# Patient Record
Sex: MALE | Race: WHITE | NOT HISPANIC OR LATINO | URBAN - METROPOLITAN AREA
[De-identification: names, ages, dates, MRNs, and addresses within clinical notes are randomized per-mention and may not be internally consistent; named-entity substitution may affect disease eponyms.]

---

## 2019-08-20 ENCOUNTER — HISTORICAL (OUTPATIENT)
Dept: RADIOLOGY | Facility: HOSPITAL | Age: 59
End: 2019-08-20

## 2019-10-14 ENCOUNTER — HISTORICAL (OUTPATIENT)
Dept: ANESTHESIOLOGY | Facility: HOSPITAL | Age: 59
End: 2019-10-14

## 2022-04-28 NOTE — OP NOTE
ADMITTING DIAGNOSIS:  Need for age-appropriate screening.    PROCEDURES:    1. Colonoscopy to the cecum with intubation of ileocecal valve.  2. Polypectomy with a cold biopsy forceps at the splenic flexure.  No ink spot made, clear margin obtained.  3. Polypectomy at 20 cm along the 2nd valve of the rectum.  Clear margin obtained.  No ink spot made.  4. Grade 3 internal hemorrhoids.    BRIEF HISTORY:  Patient is a 59-year-old  male with a history of hypercholesterolemia, hypertension, morbidly obese at 5 feet 6 inches, 250 pounds, who had a colonoscopy over 20 years ago and required age-appropriate screening colonoscopy.  He had no blood in the stool, no family history of colorectal carcinoma, and underwent colonoscopy to the cecum, with intubation of ileocecal valve.    PROCEDURE IN DETAIL:  The terminal ileum was examined up to 10 cm.  Cecum, ascending colon, transverse colon, descending colon were normal.  The patient, at the splenic flexure, had a small polyp that was removed with a cold biopsy forceps.  Another small polyp was noted in the 2nd valve of the rectum at about 20 cm that was removed with a cold biopsy forceps.  Neither one was marked with ink spot.  Both appeared to be benign or, at most, a benign adenoma.  The patient did have mild diverticulosis of the rectosigmoid colon.  There were grade 3 internal hemorrhoids that I will discuss with him and see if he is symptomatic from.       Overall, he did very well, had no problems or difficulties.  Was awakened and sent to Recovery in good condition.       I appreciate the consultation referral from nurse practitioner, Mr. Canelo López, and will notify him of my findings.        ASHLEY/FRANDY   DD: 10/14/2019 1445   DT: 10/14/2019 1503  Job # [id number]    cc: Mr. Canelo López

## 2022-07-19 ENCOUNTER — LAB VISIT (OUTPATIENT)
Dept: LAB | Facility: HOSPITAL | Age: 62
End: 2022-07-19
Attending: NURSE PRACTITIONER
Payer: COMMERCIAL

## 2022-07-19 DIAGNOSIS — Z00.00 ROUTINE GENERAL MEDICAL EXAMINATION AT A HEALTH CARE FACILITY: Primary | ICD-10-CM

## 2022-07-19 LAB
ANION GAP SERPL CALC-SCNC: 11 MEQ/L
BASOPHILS # BLD AUTO: 0.06 X10(3)/MCL (ref 0–0.2)
BASOPHILS NFR BLD AUTO: 0.8 %
BUN SERPL-MCNC: 18 MG/DL (ref 8.4–25.7)
CALCIUM SERPL-MCNC: 9.5 MG/DL (ref 8.8–10)
CHLORIDE SERPL-SCNC: 104 MMOL/L (ref 98–107)
CO2 SERPL-SCNC: 23 MMOL/L (ref 23–31)
CREAT SERPL-MCNC: 0.93 MG/DL (ref 0.73–1.18)
CREAT/UREA NIT SERPL: 19
EOSINOPHIL # BLD AUTO: 0.35 X10(3)/MCL (ref 0–0.9)
EOSINOPHIL NFR BLD AUTO: 4.9 %
ERYTHROCYTE [DISTWIDTH] IN BLOOD BY AUTOMATED COUNT: 12.2 % (ref 11.5–17)
GLUCOSE SERPL-MCNC: 111 MG/DL (ref 82–115)
HCT VFR BLD AUTO: 39.8 % (ref 42–52)
HGB BLD-MCNC: 13.4 GM/DL (ref 14–18)
IMM GRANULOCYTES # BLD AUTO: 0.06 X10(3)/MCL (ref 0–0.04)
IMM GRANULOCYTES NFR BLD AUTO: 0.8 %
LYMPHOCYTES # BLD AUTO: 1.65 X10(3)/MCL (ref 0.6–4.6)
LYMPHOCYTES NFR BLD AUTO: 22.9 %
MCH RBC QN AUTO: 30.8 PG (ref 27–31)
MCHC RBC AUTO-ENTMCNC: 33.7 MG/DL (ref 33–36)
MCV RBC AUTO: 91.5 FL (ref 80–94)
MONOCYTES # BLD AUTO: 0.95 X10(3)/MCL (ref 0.1–1.3)
MONOCYTES NFR BLD AUTO: 13.2 %
NEUTROPHILS # BLD AUTO: 4.1 X10(3)/MCL (ref 2.1–9.2)
NEUTROPHILS NFR BLD AUTO: 57.4 %
PLATELET # BLD AUTO: 238 X10(3)/MCL (ref 130–400)
PMV BLD AUTO: 10.9 FL (ref 7.4–10.4)
POTASSIUM SERPL-SCNC: 4.2 MMOL/L (ref 3.5–5.1)
RBC # BLD AUTO: 4.35 X10(6)/MCL (ref 4.7–6.1)
SODIUM SERPL-SCNC: 138 MMOL/L (ref 136–145)
WBC # SPEC AUTO: 7.2 X10(3)/MCL (ref 4.5–11.5)

## 2022-07-19 PROCEDURE — 85025 COMPLETE CBC W/AUTO DIFF WBC: CPT

## 2022-07-19 PROCEDURE — 36415 COLL VENOUS BLD VENIPUNCTURE: CPT

## 2022-07-19 PROCEDURE — 80048 BASIC METABOLIC PNL TOTAL CA: CPT
